# Patient Record
Sex: FEMALE | Race: WHITE | NOT HISPANIC OR LATINO | Employment: FULL TIME | URBAN - NONMETROPOLITAN AREA
[De-identification: names, ages, dates, MRNs, and addresses within clinical notes are randomized per-mention and may not be internally consistent; named-entity substitution may affect disease eponyms.]

---

## 2023-03-05 ENCOUNTER — HOSPITAL ENCOUNTER (EMERGENCY)
Facility: HOSPITAL | Age: 33
Discharge: HOME | End: 2023-03-05
Attending: EMERGENCY MEDICINE

## 2023-03-05 VITALS
RESPIRATION RATE: 16 BRPM | DIASTOLIC BLOOD PRESSURE: 58 MMHG | HEART RATE: 79 BPM | TEMPERATURE: 97.2 F | WEIGHT: 123.02 LBS | SYSTOLIC BLOOD PRESSURE: 105 MMHG | OXYGEN SATURATION: 98 %

## 2023-03-05 DIAGNOSIS — K29.70 GASTRITIS: ICD-10-CM

## 2023-03-05 DIAGNOSIS — K92.1 MELENA: Primary | ICD-10-CM

## 2023-03-05 LAB
ALBUMIN SERPL BCP-MCNC: 4.6 G/DL (ref 3.5–5)
ALP SERPL-CCNC: 46 U/L (ref 34–104)
ALT SERPL W P-5'-P-CCNC: 28 U/L (ref 7–52)
ANION GAP SERPL CALCULATED.3IONS-SCNC: 3 MMOL/L (ref 4–13)
AST SERPL W P-5'-P-CCNC: 26 U/L (ref 13–39)
BASOPHILS # BLD AUTO: 0.08 THOUSANDS/ÂΜL (ref 0–0.1)
BASOPHILS NFR BLD AUTO: 1 % (ref 0–1)
BILIRUB SERPL-MCNC: 0.29 MG/DL (ref 0.2–1)
BUN SERPL-MCNC: 16 MG/DL (ref 5–25)
CALCIUM SERPL-MCNC: 9 MG/DL (ref 8.4–10.2)
CHLORIDE SERPL-SCNC: 106 MMOL/L (ref 96–108)
CO2 SERPL-SCNC: 29 MMOL/L (ref 21–32)
CREAT SERPL-MCNC: 0.63 MG/DL (ref 0.6–1.3)
EOSINOPHIL # BLD AUTO: 0.15 THOUSAND/ÂΜL (ref 0–0.61)
EOSINOPHIL NFR BLD AUTO: 1 % (ref 0–6)
ERYTHROCYTE [DISTWIDTH] IN BLOOD BY AUTOMATED COUNT: 14 % (ref 11.6–15.1)
GFR SERPL CREATININE-BSD FRML MDRD: 117 ML/MIN/1.73SQ M
GLUCOSE SERPL-MCNC: 86 MG/DL (ref 65–140)
HCT VFR BLD AUTO: 39.3 % (ref 34.8–46.1)
HGB BLD-MCNC: 12.5 G/DL (ref 11.5–15.4)
IMM GRANULOCYTES # BLD AUTO: 0.08 THOUSAND/UL (ref 0–0.2)
IMM GRANULOCYTES NFR BLD AUTO: 1 % (ref 0–2)
LACTATE SERPL-SCNC: 0.7 MMOL/L (ref 0.5–2)
LIPASE SERPL-CCNC: 22 U/L (ref 11–82)
LYMPHOCYTES # BLD AUTO: 1.4 THOUSANDS/ÂΜL (ref 0.6–4.47)
LYMPHOCYTES NFR BLD AUTO: 11 % (ref 14–44)
MCH RBC QN AUTO: 28.5 PG (ref 26.8–34.3)
MCHC RBC AUTO-ENTMCNC: 31.8 G/DL (ref 31.4–37.4)
MCV RBC AUTO: 90 FL (ref 82–98)
MONOCYTES # BLD AUTO: 0.39 THOUSAND/ÂΜL (ref 0.17–1.22)
MONOCYTES NFR BLD AUTO: 3 % (ref 4–12)
NEUTROPHILS # BLD AUTO: 10.16 THOUSANDS/ÂΜL (ref 1.85–7.62)
NEUTS SEG NFR BLD AUTO: 83 % (ref 43–75)
NRBC BLD AUTO-RTO: 0 /100 WBCS
PLATELET # BLD AUTO: 250 THOUSANDS/UL (ref 149–390)
PMV BLD AUTO: 10.6 FL (ref 8.9–12.7)
POTASSIUM SERPL-SCNC: 3.9 MMOL/L (ref 3.5–5.3)
PROT SERPL-MCNC: 7.3 G/DL (ref 6.4–8.4)
RBC # BLD AUTO: 4.39 MILLION/UL (ref 3.81–5.12)
SODIUM SERPL-SCNC: 138 MMOL/L (ref 135–147)
WBC # BLD AUTO: 12.26 THOUSAND/UL (ref 4.31–10.16)

## 2023-03-05 RX ORDER — ONDANSETRON 2 MG/ML
4 INJECTION INTRAMUSCULAR; INTRAVENOUS ONCE
Status: COMPLETED | OUTPATIENT
Start: 2023-03-05 | End: 2023-03-05

## 2023-03-05 RX ORDER — PANTOPRAZOLE SODIUM 40 MG/1
40 TABLET, DELAYED RELEASE ORAL DAILY
Qty: 20 TABLET | Refills: 0 | Status: SHIPPED | OUTPATIENT
Start: 2023-03-05 | End: 2023-03-25

## 2023-03-05 RX ADMIN — SODIUM CHLORIDE 80 MG: 9 INJECTION, SOLUTION INTRAVENOUS at 08:51

## 2023-03-05 RX ADMIN — ONDANSETRON 4 MG: 2 INJECTION INTRAMUSCULAR; INTRAVENOUS at 08:35

## 2023-03-05 NOTE — DISCHARGE INSTRUCTIONS
Return immediately if worse or any new symptoms  Please arrange gastroenterology follow up:     [facility] call appointment line [phone] OR directly [phone]    [facility]  [address], [address] [place] [address] Phone: [phone] Fax: [phone]     [address] [place] [address] [facility] [address], [address] Phone: [phone]     [address]   207 [facility], [address] Phone: [phone]     [address], [facility] [address] Phone: [phone]

## 2023-03-05 NOTE — ED NOTES
Pt in no acute distress  Ambulates with a steady gait   Verbalizes understanding of discharge instructions       Kimberly Morfin RN  03/05/23 6283

## 2023-03-05 NOTE — ED PROVIDER NOTES
History  Chief Complaint   Patient presents with   • Black or Bloody Stool     Pt states coffee ground emesis and 2 episodes of black tarry stool this morning  Denies abdominal pain  Denies hx of same  No blood thinners  77-year-old female describes epigastric discomfort and vomited with coffee-ground appearing emesis, also had dark appearing stool this morning  Notes stool was brown yesterday  Also was drinking alcohol last evening  Denies use of NSAIDs including aspirin, ibuprofen and naproxen  No prior history of gastric or intestinal issues  No family history of intestinal issues, cancer  No chest pain or dyspnea or hemoptysis  LMP approximately 2 weeks ago, recent  and tubal ligation  History provided by:  Patient  Black or Bloody Stool  Quality: Black  Chronicity:  New  Similar prior episodes: no    Relieved by:  Nothing  Worsened by:  Nothing  Ineffective treatments:  None tried  Associated symptoms: abdominal pain and vomiting    Associated symptoms: no hematemesis    Risk factors: no anticoagulant use        None       History reviewed  No pertinent past medical history  History reviewed  No pertinent surgical history  History reviewed  No pertinent family history  I have reviewed and agree with the history as documented  E-Cigarette/Vaping   • E-Cigarette Use Never User      E-Cigarette/Vaping Substances     Social History     Tobacco Use   • Smoking status: Never   • Smokeless tobacco: Never   Vaping Use   • Vaping Use: Never used   Substance Use Topics   • Alcohol use: Never   • Drug use: Never       Review of Systems   Gastrointestinal: Positive for abdominal pain, blood in stool and vomiting  Negative for hematemesis  All other systems reviewed and are negative  Physical Exam  Physical Exam  Vitals and nursing note reviewed  Constitutional:       Comments: Pleasant, comfortable-appearing   HENT:      Head: Normocephalic and atraumatic        Mouth/Throat: Mouth: Mucous membranes are moist       Pharynx: Oropharynx is clear  Eyes:      Conjunctiva/sclera: Conjunctivae normal       Pupils: Pupils are equal, round, and reactive to light  Cardiovascular:      Rate and Rhythm: Normal rate and regular rhythm  Heart sounds: Normal heart sounds  Pulmonary:      Effort: Pulmonary effort is normal       Breath sounds: Normal breath sounds  Abdominal:      General: Bowel sounds are normal  There is no distension  Palpations: Abdomen is soft  Tenderness: There is no abdominal tenderness  Genitourinary:     Rectum: Guaiac result positive  Comments: Rectal exam without mass changes, no bleeding, dark stool  Musculoskeletal:         General: No deformity  Cervical back: Neck supple  Skin:     General: Skin is warm and dry  Neurological:      General: No focal deficit present  Mental Status: She is alert and oriented to person, place, and time  Cranial Nerves: No cranial nerve deficit  Coordination: Coordination normal    Psychiatric:         Behavior: Behavior normal          Thought Content:  Thought content normal          Judgment: Judgment normal          Vital Signs  ED Triage Vitals [03/05/23 0825]   Temperature Pulse Respirations Blood Pressure SpO2   (!) 97 2 °F (36 2 °C) 83 20 132/75 98 %      Temp Source Heart Rate Source Patient Position - Orthostatic VS BP Location FiO2 (%)   Temporal -- -- -- --      Pain Score       No Pain           Vitals:    03/05/23 0830 03/05/23 0900 03/05/23 0930 03/05/23 1000   BP: 140/82 114/65 113/67 105/58   Pulse: 87 80 71 79         Visual Acuity      ED Medications  Medications   ondansetron (ZOFRAN) injection 4 mg (4 mg Intravenous Given 3/5/23 0835)   pantoprazole (PROTONIX) 80 mg in sodium chloride 0 9 % 100 mL IVPB (0 mg Intravenous Stopped 3/5/23 0906)       Diagnostic Studies  Results Reviewed     Procedure Component Value Units Date/Time    Lactic acid [[id number]]  (Normal) Collected: 03/05/23 0834    Lab Status: Final result Specimen: Blood from Arm, Right Updated: 03/05/23 0855     LACTIC ACID 0 7 mmol/L     Narrative:      Result may be elevated if tourniquet was used during collection      Lipase [[id number]]  (Normal) Collected: 03/05/23 0834    Lab Status: Final result Specimen: Blood from Arm, Right Updated: 03/05/23 0855     Lipase 22 u/L     Comprehensive metabolic panel [[id number]]  (Abnormal) Collected: 03/05/23 0834    Lab Status: Final result Specimen: Blood from Arm, Right Updated: 03/05/23 0855     Sodium 138 mmol/L      Potassium 3 9 mmol/L      Chloride 106 mmol/L      CO2 29 mmol/L      ANION GAP 3 mmol/L      BUN 16 mg/dL      Creatinine 0 63 mg/dL      Glucose 86 mg/dL      Calcium 9 0 mg/dL      AST 26 U/L      ALT 28 U/L      Alkaline Phosphatase 46 U/L      Total Protein 7 3 g/dL      Albumin 4 6 g/dL      Total Bilirubin 0 29 mg/dL      eGFR 117 ml/min/1 73sq m     Narrative:      [facility] guidelines for Chronic Kidney Disease (CKD):   •  Stage 1 with normal or high GFR (GFR > 90 mL/min/1 73 square meters)  •  Stage 2 Mild CKD (GFR = 60-89 mL/min/1 73 square meters)  •  Stage 3A Moderate CKD (GFR = 45-59 mL/min/1 73 square meters)  •  Stage 3B Moderate CKD (GFR = 30-44 mL/min/1 73 square meters)  •  Stage 4 Severe CKD (GFR = 15-29 mL/min/1 73 square meters)  •  Stage 5 End Stage CKD (GFR <15 mL/min/1 73 square meters)  Note: GFR calculation is accurate only with a steady state creatinine    CBC and differential [[id number]]  (Abnormal) Collected: 03/05/23 0834    Lab Status: Final result Specimen: Blood from Arm, Right Updated: 03/05/23 0841     WBC 12 26 Thousand/uL      RBC 4 39 Million/uL      Hemoglobin 12 5 g/dL      Hematocrit 39 3 %      MCV 90 fL      MCH 28 5 pg      MCHC 31 8 g/dL      RDW 14 0 %      MPV 10 6 fL      Platelets 419 Thousands/uL      nRBC 0 /100 WBCs      Neutrophils Relative 83 %      Immat GRANS % 1 % Lymphocytes Relative 11 %      Monocytes Relative 3 %      Eosinophils Relative 1 %      Basophils Relative 1 %      Neutrophils Absolute 10 16 Thousands/µL      Immature Grans Absolute 0 08 Thousand/uL      Lymphocytes Absolute 1 40 Thousands/µL      Monocytes Absolute 0 39 Thousand/µL      Eosinophils Absolute 0 15 Thousand/µL      Basophils Absolute 0 08 Thousands/µL                  No orders to display              Procedures  Procedures         ED Course  ED Course as of 03/05/23 1021   Sun Mar 05, 2023   1010 LACTIC ACID: 0 7   1010 Lipase: 22   1010 WBC(!): 12 26   1010 Hemoglobin: 12 5   1019 We discussed results and care going forward and voices good understanding, spouse present and supportive                               SBIRT 20yo+    Flowsheet Row Most Recent Value   SBIRT (25 yo +)    In order to provide better care to our patients, we are screening all of our patients for alcohol and drug use  Would it be okay to ask you these screening questions? No Filed at: 03/05/2023 9444                    Medical Decision Making  66-year-old patient describes recent alcohol use with nausea and vomiting with coffee-ground emesis and dark stool this morning  She undergoes emergency department evaluation, treatment and observation for probable gastritis related issues  She remained stable for close outpatient follow-up with family medicine and possibly gastroenterology  Voiced good understanding to return immediately if worse or any new symptoms    Gastritis: acute illness or injury  Melena: acute illness or injury  Amount and/or Complexity of Data Reviewed  Labs: ordered  Decision-making details documented in ED Course  ECG/medicine tests: ordered and independent interpretation performed  Decision-making details documented in ED Course  Risk  Prescription drug management            Disposition  Final diagnoses:   Melena   Gastritis     Time reflects when diagnosis was documented in both MDM as applicable and the Disposition within this note     Time User Action Codes Description Comment    3/5/2023 10:12 AM Geno Dakota Add [K92 1] Melena     3/5/2023 10:13 AM Geno Dakota Add [K29 70] Gastritis       ED Disposition     ED Disposition   Discharge    Condition   Stable    Date/Time   Sun Mar 5, 2023 10:12 AM    Comment   Ana Bridges discharge to home/self care                 Follow-up Information     Follow up With Specialties Details Why Contact Info Additional Information    [address] Family Medicine Schedule an appointment as soon as possible for a visit in 1 week  [address] [facility] [phone] [facility] Family Medicine Schedule an appointment as soon as possible for a visit in 1 week  [phone] [facility], [address] [place] [address], [address] [facility]    Emir Ryan MD Gastroenterology Schedule an appointment as soon as possible for a visit in 1 week  [address] [place] [address]  [phone]  [phone]             Discharge Medication List as of 3/5/2023 10:16 AM      START taking these medications    Details   pantoprazole (PROTONIX) 40 mg tablet Take 1 tablet (40 mg total) by mouth daily for 20 days, Starting Sun 3/5/2023, Until Sat 3/25/2023, Normal                 PDMP Review     None          ED Provider  Electronically Signed by           Ajit Byrd DO  03/05/23 102

## 2024-12-09 ENCOUNTER — HOSPITAL ENCOUNTER (EMERGENCY)
Facility: HOSPITAL | Age: 34
Discharge: HOME | End: 2024-12-09
Attending: EMERGENCY MEDICINE | Admitting: EMERGENCY MEDICINE
Payer: COMMERCIAL

## 2024-12-09 ENCOUNTER — APPOINTMENT (EMERGENCY)
Dept: CT IMAGING | Facility: HOSPITAL | Age: 34
End: 2024-12-09
Payer: COMMERCIAL

## 2024-12-09 VITALS
OXYGEN SATURATION: 96 % | BODY MASS INDEX: 24.54 KG/M2 | TEMPERATURE: 98.2 F | HEIGHT: 60 IN | DIASTOLIC BLOOD PRESSURE: 78 MMHG | SYSTOLIC BLOOD PRESSURE: 115 MMHG | HEART RATE: 67 BPM | RESPIRATION RATE: 14 BRPM | WEIGHT: 125 LBS

## 2024-12-09 DIAGNOSIS — R10.13 EPIGASTRIC ABDOMINAL PAIN: Primary | ICD-10-CM

## 2024-12-09 DIAGNOSIS — E87.6 HYPOKALEMIA: ICD-10-CM

## 2024-12-09 LAB
ALBUMIN SERPL BCG-MCNC: 4.5 G/DL (ref 3.5–5)
ALP SERPL-CCNC: 43 U/L (ref 34–104)
ALT SERPL W P-5'-P-CCNC: 20 U/L (ref 7–52)
ANION GAP SERPL CALCULATED.3IONS-SCNC: 7 MMOL/L (ref 4–13)
APTT PPP: 25 SECONDS (ref 23–34)
AST SERPL W P-5'-P-CCNC: 16 U/L (ref 13–39)
BACTERIA UR QL AUTO: ABNORMAL /HPF
BASOPHILS # BLD AUTO: 0.04 THOUSANDS/ÂΜL (ref 0–0.1)
BASOPHILS NFR BLD AUTO: 1 % (ref 0–1)
BILIRUB SERPL-MCNC: 0.23 MG/DL (ref 0.2–1)
BILIRUB UR QL STRIP: NEGATIVE
BUN SERPL-MCNC: 14 MG/DL (ref 5–25)
CALCIUM SERPL-MCNC: 9.1 MG/DL (ref 8.4–10.2)
CHLORIDE SERPL-SCNC: 107 MMOL/L (ref 96–108)
CLARITY UR: CLEAR
CO2 SERPL-SCNC: 25 MMOL/L (ref 21–32)
COLOR UR: YELLOW
CREAT SERPL-MCNC: 0.63 MG/DL (ref 0.6–1.3)
EOSINOPHIL # BLD AUTO: 0.2 THOUSAND/ÂΜL (ref 0–0.61)
EOSINOPHIL NFR BLD AUTO: 2 % (ref 0–6)
ERYTHROCYTE [DISTWIDTH] IN BLOOD BY AUTOMATED COUNT: 12.8 % (ref 11.6–15.1)
EXT PREGNANCY TEST URINE: NEGATIVE
EXT. CONTROL: NORMAL
GFR SERPL CREATININE-BSD FRML MDRD: 115 ML/MIN/1.73SQ M
GLUCOSE SERPL-MCNC: 88 MG/DL (ref 65–140)
GLUCOSE UR STRIP-MCNC: NEGATIVE MG/DL
HCT VFR BLD AUTO: 39.3 % (ref 34.8–46.1)
HGB BLD-MCNC: 13.2 G/DL (ref 11.5–15.4)
HGB UR QL STRIP.AUTO: ABNORMAL
HYALINE CASTS #/AREA URNS LPF: ABNORMAL /LPF
IMM GRANULOCYTES # BLD AUTO: 0.04 THOUSAND/UL (ref 0–0.2)
IMM GRANULOCYTES NFR BLD AUTO: 1 % (ref 0–2)
INR PPP: 0.96 (ref 0.85–1.19)
KETONES UR STRIP-MCNC: ABNORMAL MG/DL
LACTATE SERPL-SCNC: 1.2 MMOL/L (ref 0.5–2)
LEUKOCYTE ESTERASE UR QL STRIP: NEGATIVE
LIPASE SERPL-CCNC: 28 U/L (ref 11–82)
LYMPHOCYTES # BLD AUTO: 2.88 THOUSANDS/ÂΜL (ref 0.6–4.47)
LYMPHOCYTES NFR BLD AUTO: 35 % (ref 14–44)
MCH RBC QN AUTO: 30.2 PG (ref 26.8–34.3)
MCHC RBC AUTO-ENTMCNC: 33.6 G/DL (ref 31.4–37.4)
MCV RBC AUTO: 90 FL (ref 82–98)
MONOCYTES # BLD AUTO: 0.49 THOUSAND/ÂΜL (ref 0.17–1.22)
MONOCYTES NFR BLD AUTO: 6 % (ref 4–12)
MUCOUS THREADS UR QL AUTO: ABNORMAL
NEUTROPHILS # BLD AUTO: 4.68 THOUSANDS/ÂΜL (ref 1.85–7.62)
NEUTS SEG NFR BLD AUTO: 55 % (ref 43–75)
NITRITE UR QL STRIP: NEGATIVE
NON-SQ EPI CELLS URNS QL MICRO: ABNORMAL /HPF
NRBC BLD AUTO-RTO: 0 /100 WBCS
PH UR STRIP.AUTO: 6 [PH]
PLATELET # BLD AUTO: 243 THOUSANDS/UL (ref 149–390)
PMV BLD AUTO: 10.2 FL (ref 8.9–12.7)
POTASSIUM SERPL-SCNC: 3.4 MMOL/L (ref 3.5–5.3)
PROT SERPL-MCNC: 7.7 G/DL (ref 6.4–8.4)
PROT UR STRIP-MCNC: ABNORMAL MG/DL
PROTHROMBIN TIME: 13.2 SECONDS (ref 12.3–15)
RBC # BLD AUTO: 4.37 MILLION/UL (ref 3.81–5.12)
RBC #/AREA URNS AUTO: ABNORMAL /HPF
SODIUM SERPL-SCNC: 139 MMOL/L (ref 135–147)
SP GR UR STRIP.AUTO: >=1.03 (ref 1–1.03)
UROBILINOGEN UR QL STRIP.AUTO: 0.2 E.U./DL
WBC # BLD AUTO: 8.33 THOUSAND/UL (ref 4.31–10.16)
WBC #/AREA URNS AUTO: ABNORMAL /HPF

## 2024-12-09 PROCEDURE — 99285 EMERGENCY DEPT VISIT HI MDM: CPT | Performed by: EMERGENCY MEDICINE

## 2024-12-09 PROCEDURE — 85730 THROMBOPLASTIN TIME PARTIAL: CPT | Performed by: EMERGENCY MEDICINE

## 2024-12-09 PROCEDURE — 83690 ASSAY OF LIPASE: CPT | Performed by: EMERGENCY MEDICINE

## 2024-12-09 PROCEDURE — 96361 HYDRATE IV INFUSION ADD-ON: CPT

## 2024-12-09 PROCEDURE — 74177 CT ABD & PELVIS W/CONTRAST: CPT

## 2024-12-09 PROCEDURE — 81001 URINALYSIS AUTO W/SCOPE: CPT | Performed by: EMERGENCY MEDICINE

## 2024-12-09 PROCEDURE — 83605 ASSAY OF LACTIC ACID: CPT | Performed by: EMERGENCY MEDICINE

## 2024-12-09 PROCEDURE — 85610 PROTHROMBIN TIME: CPT | Performed by: EMERGENCY MEDICINE

## 2024-12-09 PROCEDURE — 81025 URINE PREGNANCY TEST: CPT | Performed by: EMERGENCY MEDICINE

## 2024-12-09 PROCEDURE — 96375 TX/PRO/DX INJ NEW DRUG ADDON: CPT

## 2024-12-09 PROCEDURE — 80053 COMPREHEN METABOLIC PANEL: CPT | Performed by: EMERGENCY MEDICINE

## 2024-12-09 PROCEDURE — 85025 COMPLETE CBC W/AUTO DIFF WBC: CPT | Performed by: EMERGENCY MEDICINE

## 2024-12-09 PROCEDURE — 36415 COLL VENOUS BLD VENIPUNCTURE: CPT | Performed by: EMERGENCY MEDICINE

## 2024-12-09 PROCEDURE — 99284 EMERGENCY DEPT VISIT MOD MDM: CPT

## 2024-12-09 PROCEDURE — 96374 THER/PROPH/DIAG INJ IV PUSH: CPT

## 2024-12-09 RX ORDER — MORPHINE SULFATE 4 MG/ML
4 INJECTION, SOLUTION INTRAMUSCULAR; INTRAVENOUS ONCE
Status: COMPLETED | OUTPATIENT
Start: 2024-12-09 | End: 2024-12-09

## 2024-12-09 RX ORDER — SUCRALFATE 1 G/1
1 TABLET ORAL 3 TIMES DAILY PRN
Qty: 20 TABLET | Refills: 0 | Status: SHIPPED | OUTPATIENT
Start: 2024-12-09

## 2024-12-09 RX ORDER — KETOROLAC TROMETHAMINE 30 MG/ML
30 INJECTION, SOLUTION INTRAMUSCULAR; INTRAVENOUS ONCE
Status: COMPLETED | OUTPATIENT
Start: 2024-12-09 | End: 2024-12-09

## 2024-12-09 RX ORDER — ONDANSETRON 2 MG/ML
4 INJECTION INTRAMUSCULAR; INTRAVENOUS ONCE
Status: COMPLETED | OUTPATIENT
Start: 2024-12-09 | End: 2024-12-09

## 2024-12-09 RX ORDER — OXYCODONE AND ACETAMINOPHEN 5; 325 MG/1; MG/1
1 TABLET ORAL EVERY 4 HOURS PRN
Qty: 10 TABLET | Refills: 0 | Status: SHIPPED | OUTPATIENT
Start: 2024-12-09

## 2024-12-09 RX ORDER — OMEPRAZOLE 40 MG/1
40 CAPSULE, DELAYED RELEASE ORAL DAILY
Qty: 20 CAPSULE | Refills: 0 | Status: SHIPPED | OUTPATIENT
Start: 2024-12-09 | End: 2024-12-29

## 2024-12-09 RX ORDER — PANTOPRAZOLE SODIUM 40 MG/10ML
40 INJECTION, POWDER, LYOPHILIZED, FOR SOLUTION INTRAVENOUS ONCE
Status: COMPLETED | OUTPATIENT
Start: 2024-12-09 | End: 2024-12-09

## 2024-12-09 RX ADMIN — PANTOPRAZOLE SODIUM 40 MG: 40 INJECTION, POWDER, LYOPHILIZED, FOR SOLUTION INTRAVENOUS at 01:20

## 2024-12-09 RX ADMIN — KETOROLAC TROMETHAMINE 30 MG: 30 INJECTION, SOLUTION INTRAMUSCULAR; INTRAVENOUS at 01:20

## 2024-12-09 RX ADMIN — MORPHINE SULFATE 4 MG: 4 INJECTION INTRAVENOUS at 02:16

## 2024-12-09 RX ADMIN — IOHEXOL 100 ML: 350 INJECTION, SOLUTION INTRAVENOUS at 02:14

## 2024-12-09 RX ADMIN — ONDANSETRON 4 MG: 2 INJECTION INTRAMUSCULAR; INTRAVENOUS at 02:16

## 2024-12-09 RX ADMIN — SODIUM CHLORIDE 1000 ML: 0.9 INJECTION, SOLUTION INTRAVENOUS at 01:21

## 2024-12-09 NOTE — ED PROVIDER NOTES
Time reflects when diagnosis was documented in both MDM as applicable and the Disposition within this note       Time User Action Codes Description Comment    12/9/2024  3:16 AM Jh Raymundo Add [R10.13] Epigastric abdominal pain     12/9/2024  3:16 AM Jh Raymundo Add [E87.6] Hypokalemia     12/9/2024  3:16 AM Jh Ramyundo Modify [E87.6] Hypokalemia mild          ED Disposition       ED Disposition   Discharge    Condition   Stable    Date/Time   Mon Dec 9, 2024  3:16 AM    Comment   Deysi Solo discharge to home/self care.                   Assessment & Plan       Medical Decision Making  Differential diagnosis included but not limited to diverticulitis colitis bowel obstruction cholecystitis pancreatitis.  Patient remained clinically and hemodynamically stable in the emergency department she is afebrile nontoxic well-appearing workup in the ED reveals no evidence of acute intra-abdominal pathology.  No clinical evidence of pelvic congestion syndrome present no pelvic pain.  Symptoms and history strongly consistent with acute gastritis for now we will recommend antacids and pain control and supportive care plenty of fluids and prompt follow-up with primary physician and gastroenterology referral provided for further evaluation and treatment and obtain test results. return precautions and anticipatory guidance discussed.      Problems Addressed:  Epigastric abdominal pain: acute illness or injury  Hypokalemia: acute illness or injury    Amount and/or Complexity of Data Reviewed  Labs: ordered. Decision-making details documented in ED Course.  Radiology: ordered. Decision-making details documented in ED Course.    Risk  Prescription drug management.        ED Course as of 12/09/24 0326   Mon Dec 09, 2024   0315 No pelvic pain present.         Medications   sodium chloride 0.9 % bolus 1,000 mL (0 mL Intravenous Stopped 12/9/24 0225)   ketorolac (TORADOL) injection 30 mg (30 mg Intravenous Given 12/9/24 0120)    pantoprazole (PROTONIX) injection 40 mg (40 mg Intravenous Given 12/9/24 0120)   morphine injection 4 mg (4 mg Intravenous Given 12/9/24 0216)   ondansetron (ZOFRAN) injection 4 mg (4 mg Intravenous Given 12/9/24 0216)   iohexol (OMNIPAQUE) 350 MG/ML injection (SINGLE-DOSE) 100 mL (100 mL Intravenous Given 12/9/24 0214)       ED Risk Strat Scores                           SBIRT 22yo+      Flowsheet Row Most Recent Value   Initial Alcohol Screen: US AUDIT-C     1. How often do you have a drink containing alcohol? 0 Filed at: 12/09/2024 0127   2. How many drinks containing alcohol do you have on a typical day you are drinking?  0 Filed at: 12/09/2024 0127   3b. FEMALE Any Age, or MALE 65+: How often do you have 4 or more drinks on one occassion? 0 Filed at: 12/09/2024 0127   Audit-C Score 0 Filed at: 12/09/2024 0127   FREDO: How many times in the past year have you...    Used an illegal drug or used a prescription medication for non-medical reasons? Never Filed at: 12/09/2024 0127                            History of Present Illness       Chief Complaint   Patient presents with    Abdominal Pain     + abdominal pain that started 2 days ago. Denies nausea, diarrhea or vomiting.        History reviewed. No pertinent past medical history.   Past Surgical History:   Procedure Laterality Date    TUBAL LIGATION        History reviewed. No pertinent family history.   Social History     Tobacco Use    Smoking status: Never    Smokeless tobacco: Never   Vaping Use    Vaping status: Never Used   Substance Use Topics    Alcohol use: Yes     Comment: socially per the pt.    Drug use: Not Currently      E-Cigarette/Vaping    E-Cigarette Use Never User       E-Cigarette/Vaping Substances      I have reviewed and agree with the history as documented.     Patient is a 34-year-old female presents emergency department complaining of epigastric abdominal pain started 2 days ago improved then returned worse this evening.  No associated  nausea vomiting or diarrhea or constipation no fevers or chills or dysuria or difficulty urinating.  History of prior tubal ligation no other abdominal surgery.        History provided by:  Patient  Abdominal Pain  Pain location:  Epigastric  Pain quality: aching and cramping    Associated symptoms: no chest pain, no diarrhea, no fever, no nausea, no shortness of breath and no vomiting        Review of Systems   Constitutional:  Negative for fever.   Respiratory:  Negative for shortness of breath.    Cardiovascular:  Negative for chest pain.   Gastrointestinal:  Positive for abdominal pain. Negative for diarrhea, nausea and vomiting.   All other systems reviewed and are negative.          Objective       ED Triage Vitals [12/09/24 0104]   Temperature Pulse Blood Pressure Respirations SpO2 Patient Position - Orthostatic VS   98.2 °F (36.8 °C) 67 129/63 16 97 % --      Temp Source Heart Rate Source BP Location FiO2 (%) Pain Score    Temporal Monitor Left arm -- 8      Vitals      Date and Time Temp Pulse SpO2 Resp BP Pain Score FACES Pain Rating User   12/09/24 0216 -- -- -- -- -- 7 -- SR   12/09/24 0120 -- -- -- -- -- 8 -- SR   12/09/24 0104 98.2 °F (36.8 °C) 67 97 % 16 129/63 8 -- NM            Physical Exam  Vitals and nursing note reviewed.   Constitutional:       General: She is not in acute distress.     Appearance: Normal appearance.   HENT:      Head: Normocephalic and atraumatic.      Nose: Nose normal.   Eyes:      Conjunctiva/sclera: Conjunctivae normal.   Pulmonary:      Effort: Pulmonary effort is normal. No respiratory distress.   Abdominal:      Tenderness: There is abdominal tenderness in the epigastric area. There is no guarding or rebound.   Skin:     General: Skin is dry.   Neurological:      General: No focal deficit present.      Mental Status: She is alert and oriented to person, place, and time.         Results Reviewed       Procedure Component Value Units Date/Time    Comprehensive metabolic  panel [[id number]]  (Abnormal) Collected: 12/09/24 0119    Lab Status: Final result Specimen: Blood from Arm, Left Updated: 12/09/24 0159     Sodium 139 mmol/L      Potassium 3.4 mmol/L      Chloride 107 mmol/L      CO2 25 mmol/L      ANION GAP 7 mmol/L      BUN 14 mg/dL      Creatinine 0.63 mg/dL      Glucose 88 mg/dL      Calcium 9.1 mg/dL      AST 16 U/L      ALT 20 U/L      Alkaline Phosphatase 43 U/L      Total Protein 7.7 g/dL      Albumin 4.5 g/dL      Total Bilirubin 0.23 mg/dL      eGFR 115 ml/min/1.73sq m     Narrative:      National Kidney Disease Foundation guidelines for Chronic Kidney Disease (CKD):     Stage 1 with normal or high GFR (GFR > 90 mL/min/1.73 square meters)    Stage 2 Mild CKD (GFR = 60-89 mL/min/1.73 square meters)    Stage 3A Moderate CKD (GFR = 45-59 mL/min/1.73 square meters)    Stage 3B Moderate CKD (GFR = 30-44 mL/min/1.73 square meters)    Stage 4 Severe CKD (GFR = 15-29 mL/min/1.73 square meters)    Stage 5 End Stage CKD (GFR <15 mL/min/1.73 square meters)  Note: GFR calculation is accurate only with a steady state creatinine    Lipase [[id number]]  (Normal) Collected: 12/09/24 0119    Lab Status: Final result Specimen: Blood from Arm, Left Updated: 12/09/24 0159     Lipase 28 u/L     Lactic acid, plasma (w/reflex if result > 2.0) [[id number]]  (Normal) Collected: 12/09/24 0119    Lab Status: Final result Specimen: Blood from Arm, Left Updated: 12/09/24 0158     LACTIC ACID 1.2 mmol/L     Narrative:      Result may be elevated if tourniquet was used during collection.    Urine Microscopic [[id number]]  (Abnormal) Collected: 12/09/24 0119    Lab Status: Final result Specimen: Urine, Clean Catch Updated: 12/09/24 0155     RBC, UA 0-5 /hpf      WBC, UA 0-5 /hpf      Epithelial Cells Occasional /hpf      Bacteria, UA Occasional /hpf      Hyaline Casts, UA 0-1 /lpf      MUCUS THREADS Moderate    Protime-INR [[id number]]  (Normal) Collected: 12/09/24 0119    Lab Status: Final result  Specimen: Blood from Arm, Left Updated: 12/09/24 0155     Protime 13.2 seconds      INR 0.96    Narrative:      INR Therapeutic Range    Indication                                             INR Range      Atrial Fibrillation                                               2.0-3.0  Hypercoagulable State                                    2.0.2.3  Left Ventricular Asist Device                            2.0-3.0  Mechanical Heart Valve                                  -    Aortic(with afib, MI, embolism, HF, LA enlargement,    and/or coagulopathy)                                     2.0-3.0 (2.5-3.5)     Mitral                                                             2.5-3.5  Prosthetic/Bioprosthetic Heart Valve               2.0-3.0  Venous thromboembolism (VTE: VT, PE        2.0-3.0    APTT [[id number]]  (Normal) Collected: 12/09/24 0119    Lab Status: Final result Specimen: Blood from Arm, Left Updated: 12/09/24 0155     PTT 25 seconds     UA w Reflex to Microscopic w Reflex to Culture [[id number]]  (Abnormal) Collected: 12/09/24 0119    Lab Status: Final result Specimen: Urine, Clean Catch Updated: 12/09/24 0145     Color, UA Yellow     Clarity, UA Clear     Specific Gravity, UA >=1.030     pH, UA 6.0     Leukocytes, UA Negative     Nitrite, UA Negative     Protein, UA Trace mg/dl      Glucose, UA Negative mg/dl      Ketones, UA 15 (1+) mg/dl      Urobilinogen, UA 0.2 E.U./dl      Bilirubin, UA Negative     Occult Blood, UA Small    CBC and differential [[id number]] Collected: 12/09/24 0119    Lab Status: Final result Specimen: Blood from Arm, Left Updated: 12/09/24 0141     WBC 8.33 Thousand/uL      RBC 4.37 Million/uL      Hemoglobin 13.2 g/dL      Hematocrit 39.3 %      MCV 90 fL      MCH 30.2 pg      MCHC 33.6 g/dL      RDW 12.8 %      MPV 10.2 fL      Platelets 243 Thousands/uL      nRBC 0 /100 WBCs      Segmented % 55 %      Immature Grans % 1 %      Lymphocytes % 35 %      Monocytes % 6 %      Eosinophils  Relative 2 %      Basophils Relative 1 %      Absolute Neutrophils 4.68 Thousands/µL      Absolute Immature Grans 0.04 Thousand/uL      Absolute Lymphocytes 2.88 Thousands/µL      Absolute Monocytes 0.49 Thousand/µL      Eosinophils Absolute 0.20 Thousand/µL      Basophils Absolute 0.04 Thousands/µL     POCT pregnancy, urine [[id number]]  (Normal) Collected: 12/09/24 0116    Lab Status: Final result Updated: 12/09/24 0119     EXT Preg Test, Ur Negative     Control Valid            CT abdomen pelvis with contrast   Final Interpretation by Sulaiman Allen MD (12/09 0310)      1.  No evidence for bowel obstruction, inflammation, obstructive uropathy, or free air. Mild colonic constipation.   2.  Multiple nonspecific subcentimeter mesenteric lymph nodes are seen. No significant lymphadenopathy by size criteria.   3.  Crenulated follicle in the right adnexal region noted. Trace lower pelvic free fluid which could be physiologic in this young patient.   4.  Multiple prominent parametrial enhancing veins are seen, left greater than right. Findings could indicate pelvic congestion in the appropriate clinical setting of chronic pelvic pain. Recommend clinical correlation.         Workstation performed: [id number]             Procedures    ED Medication and Procedure Management   Prior to Admission Medications   Prescriptions Last Dose Informant Patient Reported? Taking?   pantoprazole (PROTONIX) 40 mg tablet   No No   Sig: Take 1 tablet (40 mg total) by mouth daily for 20 days      Facility-Administered Medications: None     Patient's Medications   Discharge Prescriptions    OMEPRAZOLE (PRILOSEC) 40 MG CAPSULE    Take 1 capsule (40 mg total) by mouth daily for 20 days       Start Date: 12/9/2024 End Date: 12/29/2024       Order Dose: 40 mg       Quantity: 20 capsule    Refills: 0    OXYCODONE-ACETAMINOPHEN (PERCOCET) 5-325 MG PER TABLET    Take 1 tablet by mouth every 4 (four) hours as needed for moderate pain for up to 10  doses Max Daily Amount: 6 tablets       Start Date: 12/9/2024 End Date: --       Order Dose: 1 tablet       Quantity: 10 tablet    Refills: 0    SUCRALFATE (CARAFATE) 1 G TABLET    Take 1 tablet (1 g total) by mouth 3 (three) times a day as needed (gastritis)       Start Date: 12/9/2024 End Date: --       Order Dose: 1 g       Quantity: 20 tablet    Refills: 0       ED SEPSIS DOCUMENTATION   Time reflects when diagnosis was documented in both MDM as applicable and the Disposition within this note       Time User Action Codes Description Comment    12/9/2024  3:16 AM Jh Raymundo Add [R10.13] Epigastric abdominal pain     12/9/2024  3:16 AM Jh Raymundo Add [E87.6] Hypokalemia     12/9/2024  3:16 AM Jh Raymundo Modify [E87.6] Hypokalemia mild                 Jh Raymundo DO  12/09/24 0326

## 2024-12-13 ENCOUNTER — TELEPHONE (OUTPATIENT)
Dept: GASTROENTEROLOGY | Facility: CLINIC | Age: 34
End: 2024-12-13

## 2024-12-13 NOTE — TELEPHONE ENCOUNTER
LMOM Hello, I'm calling from [facility]. Your doctor referred you to our clinic for a consultation with one of our gastro specialists please call the office to schedule an appointment at [phone]. Thank you.